# Patient Record
Sex: FEMALE | Race: WHITE | NOT HISPANIC OR LATINO | Employment: UNEMPLOYED | URBAN - NONMETROPOLITAN AREA
[De-identification: names, ages, dates, MRNs, and addresses within clinical notes are randomized per-mention and may not be internally consistent; named-entity substitution may affect disease eponyms.]

---

## 2017-01-16 RX ORDER — CITALOPRAM 40 MG/1
TABLET ORAL
Qty: 30 TABLET | Refills: 11 | Status: SHIPPED | OUTPATIENT
Start: 2017-01-16 | End: 2018-01-18 | Stop reason: SDUPTHER

## 2017-02-02 ENCOUNTER — OFFICE VISIT (OUTPATIENT)
Dept: FAMILY MEDICINE CLINIC | Facility: CLINIC | Age: 47
End: 2017-02-02

## 2017-02-02 VITALS
HEART RATE: 91 BPM | TEMPERATURE: 97.7 F | WEIGHT: 189.6 LBS | BODY MASS INDEX: 30.47 KG/M2 | DIASTOLIC BLOOD PRESSURE: 70 MMHG | SYSTOLIC BLOOD PRESSURE: 108 MMHG | HEIGHT: 66 IN | OXYGEN SATURATION: 99 %

## 2017-02-02 DIAGNOSIS — H69.82 EUSTACHIAN TUBE DYSFUNCTION, LEFT: Primary | ICD-10-CM

## 2017-02-02 PROCEDURE — 99213 OFFICE O/P EST LOW 20 MIN: CPT | Performed by: FAMILY MEDICINE

## 2017-02-02 RX ORDER — METHYLPREDNISOLONE 4 MG/1
TABLET ORAL
Qty: 21 TABLET | Refills: 0 | Status: SHIPPED | OUTPATIENT
Start: 2017-02-02 | End: 2018-08-20

## 2017-02-02 RX ORDER — FLUTICASONE PROPIONATE 50 MCG
2 SPRAY, SUSPENSION (ML) NASAL DAILY
Qty: 1 EACH | Refills: 11 | Status: SHIPPED | OUTPATIENT
Start: 2017-02-02 | End: 2017-03-04

## 2017-02-02 RX ORDER — LORATADINE 10 MG/1
10 TABLET ORAL DAILY
Qty: 30 TABLET | Refills: 11 | Status: SHIPPED | OUTPATIENT
Start: 2017-02-02 | End: 2018-02-20 | Stop reason: SDUPTHER

## 2017-02-03 NOTE — PROGRESS NOTES
Subjective   Erica Cori Muhammad is a 46 y.o. female.     History of Present Illness     Left earache 2 days ago.  No fever chills.  Has sinus symptoms.    Review of Systems   Constitutional: Negative for chills, fatigue and fever.   HENT: Positive for ear pain. Negative for congestion, ear discharge, facial swelling, hearing loss, postnasal drip, rhinorrhea, sinus pressure, sore throat, trouble swallowing and voice change.    Eyes: Negative for discharge, redness and visual disturbance.   Respiratory: Negative for cough, chest tightness, shortness of breath and wheezing.    Cardiovascular: Negative for chest pain and palpitations.   Gastrointestinal: Negative for abdominal pain, blood in stool, constipation, diarrhea, nausea and vomiting.   Endocrine: Negative for polydipsia and polyuria.   Genitourinary: Negative for dysuria, flank pain, hematuria and urgency.   Musculoskeletal: Negative for arthralgias, back pain, joint swelling and myalgias.   Skin: Negative for rash.   Neurological: Negative for dizziness, weakness, numbness and headaches.   Hematological: Negative for adenopathy.   Psychiatric/Behavioral: Negative for confusion and sleep disturbance. The patient is not nervous/anxious.        Objective   Physical Exam   Constitutional: She is oriented to person, place, and time. She appears well-developed and well-nourished.   HENT:   Head: Normocephalic and atraumatic.   Right Ear: External ear normal.   Left Ear: External ear normal.   Nose: Nose normal.   Mouth/Throat: Oropharynx is clear and moist.   Eyes: Conjunctivae and EOM are normal. Pupils are equal, round, and reactive to light.   Neck: Normal range of motion. Neck supple.   Cardiovascular: Normal rate, regular rhythm and normal heart sounds.  Exam reveals no gallop and no friction rub.    No murmur heard.  Pulmonary/Chest: Effort normal and breath sounds normal.   Abdominal: Soft. Bowel sounds are normal. She exhibits no distension. There is no  tenderness. There is no rebound and no guarding.   Musculoskeletal: Normal range of motion. She exhibits no edema or deformity.   Neurological: She is alert and oriented to person, place, and time. No cranial nerve deficit.   Skin: Skin is warm and dry. No rash noted. No erythema.   Psychiatric: She has a normal mood and affect. Her behavior is normal. Judgment and thought content normal.   Nursing note and vitals reviewed.      Assessment/Plan   Erica was seen today for earache.    Diagnoses and all orders for this visit:    Eustachian tube dysfunction, left  -     loratadine (CLARITIN) 10 MG tablet; Take 1 tablet by mouth Daily.  -     fluticasone (FLONASE) 50 MCG/ACT nasal spray; 2 sprays into each nostril Daily for 30 days. Administer 2 sprays in each nostril for each dose.  -     MethylPREDNISolone (MEDROL, AVERY,) 4 MG tablet; Take as directed on package instructions.      Went over meds.  May take 2-3 weeks for ear to clear

## 2017-03-20 RX ORDER — OSELTAMIVIR PHOSPHATE 75 MG/1
75 CAPSULE ORAL DAILY
Qty: 10 CAPSULE | Refills: 0 | Status: SHIPPED | OUTPATIENT
Start: 2017-03-20 | End: 2018-03-20 | Stop reason: SDUPTHER

## 2017-07-03 DIAGNOSIS — R51.9 HEADACHE, UNSPECIFIED HEADACHE TYPE: ICD-10-CM

## 2017-07-05 RX ORDER — VERAPAMIL HYDROCHLORIDE 240 MG/1
TABLET, FILM COATED, EXTENDED RELEASE ORAL
Qty: 30 TABLET | Refills: 5 | Status: SHIPPED | OUTPATIENT
Start: 2017-07-05 | End: 2017-12-13 | Stop reason: SDUPTHER

## 2017-07-24 RX ORDER — OMEPRAZOLE 20 MG/1
CAPSULE, DELAYED RELEASE ORAL
Qty: 30 CAPSULE | Refills: 11 | Status: SHIPPED | OUTPATIENT
Start: 2017-07-24 | End: 2018-07-31 | Stop reason: SDUPTHER

## 2017-11-20 ENCOUNTER — TELEPHONE (OUTPATIENT)
Dept: FAMILY MEDICINE CLINIC | Facility: CLINIC | Age: 47
End: 2017-11-20

## 2017-11-20 RX ORDER — ALBUTEROL SULFATE 90 UG/1
2 AEROSOL, METERED RESPIRATORY (INHALATION) 4 TIMES DAILY PRN
Qty: 1 INHALER | Refills: 11 | Status: SHIPPED | OUTPATIENT
Start: 2017-11-20

## 2017-12-13 DIAGNOSIS — R51.9 HEADACHE, UNSPECIFIED HEADACHE TYPE: ICD-10-CM

## 2017-12-13 RX ORDER — VERAPAMIL HYDROCHLORIDE 240 MG/1
TABLET, FILM COATED, EXTENDED RELEASE ORAL
Qty: 30 TABLET | Refills: 5 | Status: SHIPPED | OUTPATIENT
Start: 2017-12-13 | End: 2018-07-31 | Stop reason: SDUPTHER

## 2018-01-18 RX ORDER — CITALOPRAM 40 MG/1
TABLET ORAL
Qty: 30 TABLET | Refills: 11 | Status: SHIPPED | OUTPATIENT
Start: 2018-01-18 | End: 2018-08-20 | Stop reason: ALTCHOICE

## 2018-02-20 DIAGNOSIS — H69.82 EUSTACHIAN TUBE DYSFUNCTION, LEFT: ICD-10-CM

## 2018-02-20 RX ORDER — LORATADINE 10 MG/1
TABLET ORAL
Qty: 30 TABLET | Refills: 11 | Status: SHIPPED | OUTPATIENT
Start: 2018-02-20

## 2018-03-20 RX ORDER — OSELTAMIVIR PHOSPHATE 75 MG/1
75 CAPSULE ORAL DAILY
Qty: 10 CAPSULE | Refills: 0 | Status: SHIPPED | OUTPATIENT
Start: 2018-03-20 | End: 2018-08-20

## 2018-07-31 DIAGNOSIS — R51.9 HEADACHE, UNSPECIFIED HEADACHE TYPE: ICD-10-CM

## 2018-07-31 RX ORDER — OMEPRAZOLE 20 MG/1
CAPSULE, DELAYED RELEASE ORAL
Qty: 30 CAPSULE | Refills: 11 | Status: SHIPPED | OUTPATIENT
Start: 2018-07-31 | End: 2019-08-11 | Stop reason: SDUPTHER

## 2018-07-31 RX ORDER — VERAPAMIL HYDROCHLORIDE 240 MG/1
TABLET, FILM COATED, EXTENDED RELEASE ORAL
Qty: 30 TABLET | Refills: 5 | Status: SHIPPED | OUTPATIENT
Start: 2018-07-31 | End: 2019-01-28 | Stop reason: SDUPTHER

## 2018-08-20 ENCOUNTER — OFFICE VISIT (OUTPATIENT)
Dept: FAMILY MEDICINE CLINIC | Facility: CLINIC | Age: 48
End: 2018-08-20

## 2018-08-20 VITALS
HEART RATE: 88 BPM | SYSTOLIC BLOOD PRESSURE: 120 MMHG | DIASTOLIC BLOOD PRESSURE: 80 MMHG | OXYGEN SATURATION: 99 % | TEMPERATURE: 98.3 F | HEIGHT: 64 IN

## 2018-08-20 DIAGNOSIS — F41.9 ANXIETY: Primary | ICD-10-CM

## 2018-08-20 PROCEDURE — 99213 OFFICE O/P EST LOW 20 MIN: CPT | Performed by: FAMILY MEDICINE

## 2018-08-20 RX ORDER — BUSPIRONE HYDROCHLORIDE 5 MG/1
5 TABLET ORAL 3 TIMES DAILY
Qty: 90 TABLET | Refills: 11 | OUTPATIENT
Start: 2018-08-20 | End: 2021-01-06

## 2018-08-20 RX ORDER — ESCITALOPRAM OXALATE 10 MG/1
10 TABLET ORAL DAILY
Qty: 30 TABLET | Refills: 11 | Status: SHIPPED | OUTPATIENT
Start: 2018-08-20 | End: 2018-12-07

## 2018-08-20 RX ORDER — HYDROXYZINE PAMOATE 25 MG/1
25 CAPSULE ORAL EVERY 6 HOURS PRN
Qty: 60 CAPSULE | Refills: 1 | OUTPATIENT
Start: 2018-08-20 | End: 2021-01-06

## 2018-08-20 NOTE — PROGRESS NOTES
" Subjective   Erica Cori Muhammad is a 48 y.o. female.     History of Present Illness     Sometimes feels so anxious its like fire inside.   On celexa 40mg qd  Difficult time taking care of her dad    Review of Systems   Constitutional: Negative for chills, fatigue and fever.   HENT: Negative for congestion, ear discharge, ear pain, facial swelling, hearing loss, postnasal drip, rhinorrhea, sinus pressure, sore throat, trouble swallowing and voice change.    Eyes: Negative for discharge, redness and visual disturbance.   Respiratory: Negative for cough, chest tightness, shortness of breath and wheezing.    Cardiovascular: Negative for chest pain and palpitations.   Gastrointestinal: Negative for abdominal pain, blood in stool, constipation, diarrhea, nausea and vomiting.   Endocrine: Negative for polydipsia and polyuria.   Genitourinary: Negative for dysuria, flank pain, hematuria and urgency.   Musculoskeletal: Negative for arthralgias, back pain, joint swelling and myalgias.   Skin: Negative for rash.   Neurological: Negative for dizziness, weakness, numbness and headaches.   Hematological: Negative for adenopathy.   Psychiatric/Behavioral: Negative for confusion and sleep disturbance. The patient is nervous/anxious.            /80 (BP Location: Left arm, Patient Position: Sitting, Cuff Size: Adult)   Pulse 88   Temp 98.3 °F (36.8 °C) (Temporal Artery )   Ht 162.6 cm (64.02\")   SpO2 99%       Objective     Physical Exam   Constitutional: She is oriented to person, place, and time. She appears well-developed and well-nourished.   HENT:   Head: Normocephalic and atraumatic.   Right Ear: External ear normal.   Left Ear: External ear normal.   Nose: Nose normal.   Eyes: Pupils are equal, round, and reactive to light. Conjunctivae and EOM are normal.   Neck: Normal range of motion.   Pulmonary/Chest: Effort normal.   Musculoskeletal: Normal range of motion.   Neurological: She is alert and oriented to " person, place, and time.   Psychiatric: She has a normal mood and affect. Her behavior is normal. Judgment and thought content normal.   Nursing note and vitals reviewed.          PAST MEDICAL HISTORY     Past Medical History:   Diagnosis Date   • Acute bronchitis    • Allergic rhinitis due to pollen    • Anxiety state    • Common cold    • Cough    • Depressive disorder    • Depressive disorder    • Drug therapy     long-term   • Dysfunction of eustachian tube    • Encounter for gynecological examination    • Encounter for removal of intrauterine contraceptive device    • Essential hypertension    • Eustachian tube disorder    • Gastroesophageal reflux disease    • Headache     chronic rebound   • Health maintenance examination     individual health exam   • Hiatal hernia    • Hypertensive disorder     resolved after pregnancy   • IUD check up     in place   • Lump or mass in breast    • Plantar fascial fibromatosis    • Serous otitis media    • Upper respiratory infection       PAST SURGICAL HISTORY     Past Surgical History:   Procedure Laterality Date   • CHOLECYSTECTOMY  03/16/2004   • DILATATION AND CURETTAGE     • INJECTION OF MEDICATION  03/17/2016    Kenalog      SOCIAL HISTORY     Social History     Social History   • Marital status:      Social History Main Topics   • Smoking status: Never Smoker   • Smokeless tobacco: Never Used   • Alcohol use No   • Drug use: No   • Sexual activity: Defer     Other Topics Concern   • Not on file      ALLERGIES   Clinoril [sulindac] and Neurontin [gabapentin]   MEDICATIONS     Current Outpatient Prescriptions   Medication Sig Dispense Refill   • albuterol (PROVENTIL HFA;VENTOLIN HFA) 108 (90 Base) MCG/ACT inhaler Inhale 2 puffs 4 (Four) Times a Day As Needed for Wheezing. Any brand albuterol is fine 1 inhaler 11   • cetirizine (ZyrTEC) 10 MG tablet Take 10 mg by mouth daily.     • loratadine (CLARITIN) 10 MG tablet TAKE 1 TABLET BY MOUTH DAILY. 30 tablet 11   •  omeprazole (priLOSEC) 20 MG capsule TAKE 1 CAPSULE BY MOUTH EVERY MORNING 30 MINUTES BEFORE FIRST MEAL. 30 capsule 11   • verapamil SR (CALAN-SR) 240 MG CR tablet TAKE 1 TABLET BY MOUTH EVERY NIGHT AT BEDTIME 30 tablet 5   • benzonatate (TESSALON) 200 MG capsule Take 1 capsule by mouth 3 (Three) Times a Day As Needed for Cough. 30 capsule 0   • busPIRone (BUSPAR) 5 MG tablet Take 1 tablet by mouth 3 (Three) Times a Day. 90 tablet 11   • escitalopram (LEXAPRO) 10 MG tablet Take 1 tablet by mouth Daily. 30 tablet 11   • promethazine (PHENERGAN) 25 MG tablet Take 1 tablet by mouth Every 6 (Six) Hours As Needed for Nausea or Vomiting. 20 tablet 0   • promethazine-dextromethorphan (PROMETHAZINE-DM) 6.25-15 MG/5ML syrup Take 5 mL by mouth At Night As Needed for Cough. 120 mL 0   • pseudoephedrine (SUDAFED) 30 MG tablet Take 1 tablet by mouth Every 6 (Six) Hours As Needed for Congestion. 30 tablet 0     No current facility-administered medications for this visit.         The following portions of the patient's history were reviewed and updated as appropriate: allergies, current medications, past family history, past medical history, past social history, past surgical history and problem list.        Assessment/Plan   Erica was seen today for anxiety.    Diagnoses and all orders for this visit:    Anxiety    Other orders  -     escitalopram (LEXAPRO) 10 MG tablet; Take 1 tablet by mouth Daily.  -     busPIRone (BUSPAR) 5 MG tablet; Take 1 tablet by mouth 3 (Three) Times a Day.  -     hydrOXYzine (VISTARIL) 25 MG capsule; Take 1 capsule by mouth Every 6 (Six) Hours As Needed for Anxiety.        Change to lexapro so if I need to, I can increase the dose  Added buspar  Vistaril prn.               No Follow-up on file.                  This document has been electronically signed by Amauri Brown MD on August 20, 2018 2:33 PM

## 2019-01-07 RX ORDER — CITALOPRAM 40 MG/1
TABLET ORAL
Qty: 30 TABLET | Refills: 11 | Status: SHIPPED | OUTPATIENT
Start: 2019-01-07 | End: 2020-01-13

## 2019-01-28 DIAGNOSIS — R51.9 HEADACHE, UNSPECIFIED HEADACHE TYPE: ICD-10-CM

## 2019-01-29 RX ORDER — VERAPAMIL HYDROCHLORIDE 240 MG/1
TABLET, FILM COATED, EXTENDED RELEASE ORAL
Qty: 30 TABLET | Refills: 11 | Status: SHIPPED | OUTPATIENT
Start: 2019-01-29 | End: 2020-02-03

## 2019-08-12 RX ORDER — OMEPRAZOLE 20 MG/1
CAPSULE, DELAYED RELEASE ORAL
Qty: 30 CAPSULE | Refills: 0 | Status: SHIPPED | OUTPATIENT
Start: 2019-08-12 | End: 2019-09-10 | Stop reason: SDUPTHER

## 2019-09-10 RX ORDER — OMEPRAZOLE 20 MG/1
CAPSULE, DELAYED RELEASE ORAL
Qty: 30 CAPSULE | Refills: 11 | Status: SHIPPED | OUTPATIENT
Start: 2019-09-10 | End: 2020-09-17

## 2020-01-13 RX ORDER — CITALOPRAM 40 MG/1
TABLET ORAL
Qty: 30 TABLET | Refills: 0 | Status: SHIPPED | OUTPATIENT
Start: 2020-01-13 | End: 2020-02-07

## 2020-01-31 DIAGNOSIS — R51.9 HEADACHE, UNSPECIFIED HEADACHE TYPE: ICD-10-CM

## 2020-02-03 RX ORDER — VERAPAMIL HYDROCHLORIDE 240 MG/1
TABLET, FILM COATED, EXTENDED RELEASE ORAL
Qty: 30 TABLET | Refills: 11 | Status: SHIPPED | OUTPATIENT
Start: 2020-02-03 | End: 2021-01-29

## 2020-02-07 RX ORDER — CITALOPRAM 40 MG/1
TABLET ORAL
Qty: 90 TABLET | Refills: 3 | Status: SHIPPED | OUTPATIENT
Start: 2020-02-07 | End: 2021-01-29

## 2020-09-01 RX ORDER — OMEPRAZOLE 20 MG/1
CAPSULE, DELAYED RELEASE ORAL
Qty: 12 CAPSULE | OUTPATIENT
Start: 2020-09-01

## 2020-09-17 RX ORDER — OMEPRAZOLE 20 MG/1
CAPSULE, DELAYED RELEASE ORAL
Qty: 30 CAPSULE | Refills: 11 | Status: SHIPPED | OUTPATIENT
Start: 2020-09-17

## 2021-01-06 PROBLEM — K44.9 HIATAL HERNIA: Status: ACTIVE | Noted: 2019-04-23

## 2021-01-06 PROBLEM — J30.2 SEASONAL ALLERGIES: Status: ACTIVE | Noted: 2019-04-23

## 2021-01-06 PROBLEM — G89.29 CHRONIC LEFT SHOULDER PAIN: Status: ACTIVE | Noted: 2019-04-23

## 2021-01-06 PROBLEM — M25.512 CHRONIC LEFT SHOULDER PAIN: Status: ACTIVE | Noted: 2019-04-23

## 2021-01-06 PROBLEM — K21.9 GASTROESOPHAGEAL REFLUX DISEASE: Status: ACTIVE | Noted: 2019-04-23

## 2021-01-29 DIAGNOSIS — R51.9 HEADACHE, UNSPECIFIED HEADACHE TYPE: ICD-10-CM

## 2021-01-29 RX ORDER — VERAPAMIL HYDROCHLORIDE 240 MG/1
TABLET, FILM COATED, EXTENDED RELEASE ORAL
Qty: 30 TABLET | Refills: 0 | Status: SHIPPED | OUTPATIENT
Start: 2021-01-29 | End: 2021-03-01

## 2021-01-29 RX ORDER — CITALOPRAM 40 MG/1
TABLET ORAL
Qty: 30 TABLET | Refills: 0 | Status: SHIPPED | OUTPATIENT
Start: 2021-01-29 | End: 2021-03-01

## 2021-02-04 ENCOUNTER — TRANSCRIBE ORDERS (OUTPATIENT)
Dept: PHYSICAL THERAPY | Facility: HOSPITAL | Age: 51
End: 2021-02-04

## 2021-02-04 DIAGNOSIS — Z98.890 STATUS POST LEFT KNEE SURGERY: ICD-10-CM

## 2021-02-04 DIAGNOSIS — M25.562 LEFT KNEE PAIN, UNSPECIFIED CHRONICITY: Primary | ICD-10-CM

## 2021-02-16 ENCOUNTER — APPOINTMENT (OUTPATIENT)
Dept: PHYSICAL THERAPY | Facility: HOSPITAL | Age: 51
End: 2021-02-16

## 2021-02-23 ENCOUNTER — APPOINTMENT (OUTPATIENT)
Dept: PHYSICAL THERAPY | Facility: HOSPITAL | Age: 51
End: 2021-02-23

## 2021-02-28 DIAGNOSIS — R51.9 HEADACHE, UNSPECIFIED HEADACHE TYPE: ICD-10-CM

## 2021-03-01 RX ORDER — CITALOPRAM 40 MG/1
TABLET ORAL
Qty: 30 TABLET | Refills: 0 | Status: SHIPPED | OUTPATIENT
Start: 2021-03-01

## 2021-03-01 RX ORDER — VERAPAMIL HYDROCHLORIDE 240 MG/1
TABLET, FILM COATED, EXTENDED RELEASE ORAL
Qty: 30 TABLET | Refills: 0 | Status: SHIPPED | OUTPATIENT
Start: 2021-03-01

## 2021-03-02 ENCOUNTER — HOSPITAL ENCOUNTER (OUTPATIENT)
Dept: PHYSICAL THERAPY | Facility: HOSPITAL | Age: 51
Setting detail: THERAPIES SERIES
Discharge: HOME | End: 2021-03-02

## 2021-03-02 DIAGNOSIS — M17.12 PRIMARY OSTEOARTHRITIS OF LEFT KNEE: Primary | ICD-10-CM

## 2021-03-02 PROCEDURE — 97162 PT EVAL MOD COMPLEX 30 MIN: CPT

## 2021-03-02 NOTE — THERAPY EVALUATION
Outpatient Physical Therapy Ortho Initial Evaluation  [facility]     Patient Name: Erica Muhammad  : 1970  MRN: [id number]  Today's Date: 3/2/2021      Visit Date: 2021   Attendance:  (TBD approved)  Subjective Improvement: n/a  Next MD Visit: TBD  Recert Date: 3/23/2021    Therapy Diagnosis: S/P left TKA      Patient Active Problem List   Diagnosis   • Seasonal allergies   • Hiatal hernia   • Gastroesophageal reflux disease   • Chronic migraine without aura   • Chronic left shoulder pain        Past Medical History:   Diagnosis Date   • Acute bronchitis    • Allergic rhinitis due to pollen    • Anxiety state    • Common cold    • Cough    • Depressive disorder    • Depressive disorder    • Drug therapy     long-term   • Dysfunction of eustachian tube    • Encounter for gynecological examination    • Encounter for removal of intrauterine contraceptive device    • Essential hypertension    • Eustachian tube disorder    • Gastroesophageal reflux disease    • Headache     chronic rebound   • Health maintenance examination     individual health exam   • Hiatal hernia    • Hypertensive disorder     resolved after pregnancy   • IUD check up     in place   • Lump or mass in breast    • Plantar fascial fibromatosis    • Serous otitis media    • Upper respiratory infection         Past Surgical History:   Procedure Laterality Date   • CHOLECYSTECTOMY  2004   • DILATATION AND CURETTAGE     • INJECTION OF MEDICATION  2016    Kenalog       Current Outpatient Medications:   •  albuterol (PROVENTIL HFA;VENTOLIN HFA) 108 (90 Base) MCG/ACT inhaler, Inhale 2 puffs 4 (Four) Times a Day As Needed for Wheezing. Any brand albuterol is fine, Disp: 1 inhaler, Rfl: 11  •  citalopram (CeleXA) 40 MG tablet, TAKE 1/2 TO 1 TABLET BY MOUTH EVERY DAY, Disp: 30 tablet, Rfl: 0  •  Elastic Bandages & Supports (Knee Brace Adjustable Hinged) misc, 1 Device Daily., Disp: 1 each, Rfl: 0  •  loratadine  "(CLARITIN) 10 MG tablet, TAKE 1 TABLET BY MOUTH DAILY., Disp: 30 tablet, Rfl: 11  •  meloxicam (MOBIC) 7.5 MG tablet, Take 1 tablet by mouth Daily., Disp: 30 tablet, Rfl: 0  •  omeprazole (priLOSEC) 20 MG capsule, TAKE 1 CAPSULE BY MOUTH EVERY MORNING 30 MINUTES BEFORE A MEAL, Disp: 30 capsule, Rfl: 11  •  verapamil SR (CALAN-SR) 240 MG CR tablet, TAKE 1 TABLET BY MOUTH EVERY NIGHT AT BEDTIME, Disp: 30 tablet, Rfl: 0    Allergies   Allergen Reactions   • Clinoril [Sulindac] Dizziness   • Neurontin [Gabapentin] Dizziness         Visit Dx:     ICD-10-CM ICD-9-CM   1. Primary osteoarthritis of left knee  M17.12 715.16         Patient History     Row Name 03/02/21 0800             History    Chief Complaint  Joint stiffness \"Soreness\"  -      Date Current Problem(s) Began  02/03/21  -      Brief Description of Current Complaint  Pt stated that she was having knee pain for at least 6 months before undergoing a TKA on 2/3/2021. She stated that she now has soreness and stiffness. She stated that she stayed overnight at the hospital and had some physical therapy while at the hospital. She reports no complications with surgery and has not had any physical therapy since leaving the hospital.  female living in a two story home (no need to go upstairs) with her son and fiancé. 2 steps to enter without a hand rail.  -      Previous treatment for THIS PROBLEM  Rehabilitation;Surgery  -      Surgery Date:  02/03/21  -      Patient/Caregiver Goals  Improve mobility;Improve strength  -      Current Tobacco Use  No  -      Smoking Status  No  -      Patient's Rating of General Health  Good  -      Hand Dominance  right-handed  -      Occupation/sports/leisure activities  Out of work for the time being. Hobbies: TV, playing with my dog  -         Pain     Pain Location  Knee Left  -      Pain at Present  0  -      Pain at Best  0  -      Pain at Worst  8  -      Pain Frequency  Intermittent  -   " "   Pain Description  Aching  -      What Performance Factors Make the Current Problem(s) WORSE?  Standing, walking, bending knee, squatting  -      What Performance Factors Make the Current Problem(s) BETTER?  Ice, exercises from hospital  -      Tolerance Time- Standing  ~15-20 with cane  -      Tolerance Time- Walking  ~30 min with cane  -      Is your sleep disturbed?  Yes  -      Is medication used to assist with sleep?  No  -      Difficulties at work?  Not working  -      Difficulties with ADL's?  \"A little bit with dressing and bathing\". \"I have to move slow and take lots of rest breaks for cooking and cleaning.\"  -      Difficulties with recreational activities?  No issues  -         Fall Risk Assessment    Any falls in the past year:  No  -      Does patient have a fear of falling  No  -         Daily Activities    Primary Language  English  -         Safety    Are you being hurt, hit, or frightened by anyone at home or in your life?  No  -MH      Are you being neglected by a caregiver  No  -        User Key  (r) = Recorded By, (t) = Taken By, (c) = Cosigned By    Initials Name Provider Type     Beatriz Hurst, ALYSHA Physical Therapist          PT Ortho     Row Name 03/02/21 0800       Subjective Comments    Subjective Comments  See therapy patient history  -       Precautions and Contraindications    Contraindications  DOS: 2/3/2021  -       Subjective Pain    Able to rate subjective pain?  yes  -    Pre-Treatment Pain Level  0  -    Post-Treatment Pain Level  1  -       Posture/Observations    Posture/Observations Comments  Sitting posture: forward flexed trunk, rounded shoulder, forward head. Equal weight distribution. TTP left: mid-distal shin, around patella and near incision. Mod edema in knee joint grossly. Incision is clean and healing well. Staples have been removed, steri strips present.   -       General ROM    RT Lower Ext  Comment  -    LT Lower Ext  " Comment  -MH       Right Lower Ext    RT Lower Extremity Comments  AROM WFL  -MH       Left Lower Ext    Lt Knee Extension/Flexion AROM  10-95 deg; no pain  -    LT Lower Extremity Comments  Hip and ankle AROM WFL  -       MMT (Manual Muscle Testing)    Rt Lower Ext  Comments  -    Lt Lower Ext  Comments;Lt Hip Flexion;Lt Hip Extension;Lt Hip ADduction;Lt Hip ABduction;Lt Hip Internal (Medial) Rotation;Lt Hip External (Lateral) Rotation;Lt Knee Extension;Lt Knee Flexion;Lt Ankle Dorsiflexion  -       MMT Right Lower Ext    Rt Lower Extremity Comments   5/5 grossly  -MH       MMT Left Lower Ext    Lt Hip Flexion MMT, Gross Movement  (5/5) normal  -MH    Lt Hip Extension MMT, Gross Movement  (5/5) normal  -    Lt Hip ABduction MMT, Gross Movement  (4/5) good  -MH    Lt Hip ADduction MMT, Gross Movement  (4+/5) good plus  -MH    Lt Hip Internal (Medial) Rotation MMT, Gross Movement  (4+/5) good plus  -MH    Lt Hip External (Lateral) Rotation MMT, Gross Movement  (4+/5) good plus  -    Lt Knee Extension MMT, Gross Movement  (4/5) good  -MH    Lt Knee Flexion MMT, Gross Movement  (4/5) good  -MH    Lt Ankle Dorsiflexion MMT, Gross Movement  (5/5) normal  -       Sensation    Sensation WNL?  WNL  -    Light Touch  No apparent deficits  -       Lower Extremity Flexibility    Hamstrings  Left:;Moderately limited  -    Quadriceps  Left:;Moderately limited;Severely limited  -    Gastrocnemius  Left:;Moderately limited  -       Balance Skills Training    Balance Comments  Not assessed this visit  -       Gait/Stairs (Locomotion)    [place] Level (Gait)  modified independence  -    Assistive Device (Gait)  cane, straight  -    Comment (Gait/Stairs)  Antalgic gait, decreased TKE, decreased stance time on left, pronated ankle/foot, decreased heel strike for    -      User Key  (r) = Recorded By, (t) = Taken By, (c) = Cosigned By    Initials Name Provider Type    Beatriz Harper, PT  Physical Therapist                      Therapy Education  Education Details: Findings of evaluation and plan for physical therapy. Ice with elevation, ankle pumps, sit with knee floating and toes towards ceiling.  Given: HEP, Symptoms/condition management, Edema management  How Provided: Verbal  Provided to: Patient  Level of Understanding: Verbalized     PT OP Goals     Row Name 03/02/21 0800          PT Short Term Goals    STG Date to Achieve  03/23/21  -     STG 1  Pt will be independent with HEP for self-management of symptoms.  -     STG 1 Progress  New  [facility]     STG 2  Pt's knee flex AROM will improve to 120 deg.  -     STG 2 Progress  New  -     STG 3  Pt's knee ext AROM will improve to 0 deg.  -     STG 3 Progress  New  [facility]        Long Term Goals    LTG Date to Achieve  04/13/21  -     LTG 1  Pt will report a subjective improvement of at least 90% in symptoms as a measure to return to PLOF.  -     LTG 1 Progress  New  [facility]     LTG 2  Pt' LEFS score will improve by at least 18 points.  -     LTG 2 Progress  New  [facility]     LTG 3  Pt's right knee flex/ext MMT will improve to 5/5 as a measure of improved strength.  -     LTG 3 Progress  New  [facility]     LTG 4  Pt will be able to ambulate at least 600 feet without an assistive device and without gait deviations for safe community ambulation.   -     LTG 4 Progress  New  [facility]     LTG 5  Pt will be able to negotiate one flight of stairs without gait deviations for safe community ambulation.  -     LTG 5 Progress  New  [facility]     LTG 6  Pt will be able to balance on left LE in SLS for at least 15 sec.  -[facility] 6 Progress  New  [facility]        Time Calculation    PT Goal Re-Cert Due Date  03/23/21  -       User Key  (r) = Recorded By, (t) = Taken By, (c) = Cosigned By    Initials Name Provider Type    Beatriz Harper, PT Physical Therapist          PT Assessment/Plan     Row Name 03/02/21 0800          PT Assessment    Functional Limitations  Decreased  safety during functional activities;Impaired gait;Impaired locomotion;Limitation in home management;Limitations in community activities;Limitations in functional capacity and performance;Performance in leisure activities;Performance in self-care ADL  -     Impairments  Balance;Edema;Gait;Endurance;Impaired flexibility;Impaired muscle length;Impaired muscle power;Joint mobility;Muscle strength;Pain;Range of motion;Posture  -     Assessment Comments  Ms. Muhammad presents to physical therapy s/p left TKA that occurred on 2/3/2021. She is currently ambulating with a SPC (previously independent) and needs assistance with dressing and bathing. She is able to complete IADLs such cooking and cleaning but needs some help, increased time, and added rest breaks. She demonstrated decreased ROM, decreased strength, edema, TTP, decreased balance, gait deviations, pain, and decreased flexibility. She would benefit from skilled physical therapy to address these deficits in order for her to return to her prior level of function.  -     Please refer to paper survey for additional self-reported information  Yes  -MH     Rehab Potential  Good  -     Patient/caregiver participated in establishment of treatment plan and goals  Yes  -     Patient would benefit from skilled therapy intervention  Yes  -MH        PT Plan    PT Frequency  2x/week  -     Predicted Duration of Therapy Intervention (PT)  5-6 weeks  -     Planned CPT's?  PT EVAL MOD COMPLELITY: 61160;PT RE-EVAL: 30537;PT THER ACT EA 15 MIN: 24978;PT THER PROC EA 15 MIN: 23056;PT MANUAL THERAPY EA 15 MIN: 95936;PT GAIT TRAINING EA 15 MIN: 37183;PT NEUROMUSC RE-EDUCATION EA 15 MIN: 91197;PT SELF CARE/HOME MGMT/TRAIN EA 15: 01816;PT ELECTRICAL STIM UNATTEND: ;PT ELECTRICAL STIM ATTD EA 15 MIN: 36552;PT ULTRASOUND EA 15 MIN: 87620;PT HOT/COLD PACK WC NONMCARE: 43307;PT THER SUPP EA 15 MIN  -     PT Plan Comments  ROM, strength, gait, balance, stretching, modalities  and manual as needed.  -       User Key  (r) = Recorded By, (t) = Taken By, (c) = Cosigned By    Initials Name Provider Type    Beatriz Harper PT Physical Therapist            OP Exercises     Row Name 03/02/21 0800             Subjective Comments    Subjective Comments  See therapy patient history  -         Subjective Pain    Able to rate subjective pain?  yes  -      Pre-Treatment Pain Level  0  -      Post-Treatment Pain Level  1  -        User Key  (r) = Recorded By, (t) = Taken By, (c) = Cosigned By    Initials Name Provider Type    Beatriz Harper PT Physical Therapist                        Outcome Measure Options: Lower Extremity Functional Scale (LEFS)  Lower Extremity Functional Index  Any of your usual work, housework or school activities: Moderate difficulty  Your usual hobbies, recreational or sporting activities: Moderate difficulty  Getting into or out of the bath: Moderate difficulty  Walking between rooms: A little bit of difficulty  Putting on your shoes or socks: A little bit of difficulty  Squatting: Quite a bit of difficulty  Lifting an object, like a bag of groceries from the floor: Moderate difficulty  Performing light activities around your home: Moderate difficulty  Performing heavy activities around your home: Moderate difficulty  Getting into or out of a car: Quite a bit of difficulty  Walking 2 blocks: Quite a bit of difficulty  Walking a mile: Extreme difficulty or unable to perform activity  Going up or down 10 stairs (about 1 flight of stairs): Quite a bit of difficulty  Standing for 1 hour: Moderate difficulty  Sitting for 1 hour: A little bit of difficulty  Running on even ground: Quite a bit of difficulty  Running on uneven ground: Quite a bit of difficulty  Making sharp turns while running fast: Extreme difficulty or unable to perform activity  Hopping: Extreme difficulty or unable to perform activity  Rolling over in bed: A little bit of difficulty  Total:  32      Time Calculation:     Start Time: 0847  Stop Time: 0925  Time Calculation (min): 38 min     Therapy Charges for Today     Code Description Service Date Service Provider Modifiers Qty    [id number] HC PT EVAL MOD COMPLEXITY 3 3/2/2021 Beatriz uHrst, PT GP 1          PT G-Codes  Outcome Measure Options: Lower Extremity Functional Scale (LEFS)  Total: 32         Beatriz Hurst, PT  3/2/2021

## 2021-03-09 ENCOUNTER — HOSPITAL ENCOUNTER (OUTPATIENT)
Dept: PHYSICAL THERAPY | Facility: HOSPITAL | Age: 51
Setting detail: THERAPIES SERIES
Discharge: HOME | End: 2021-03-09

## 2021-03-09 DIAGNOSIS — M17.12 PRIMARY OSTEOARTHRITIS OF LEFT KNEE: Primary | ICD-10-CM

## 2021-03-09 PROCEDURE — 97110 THERAPEUTIC EXERCISES: CPT

## 2021-03-09 NOTE — THERAPY TREATMENT NOTE
Outpatient Physical Therapy Ortho Treatment Note  [facility]     Patient Name: Erica Muhammad  : 1970  MRN: [id number]  Today's Date: 3/9/2021      Visit Date: 2021     Attendance: 2/2  (Eval + 2 until 2021)  Subjective Improvement: 0%  Next MD Visit: 03/15/2021  Recert Date: 3/23/2021     Therapy Diagnosis: S/P left TKA    Visit Dx:    ICD-10-CM ICD-9-CM   1. Primary osteoarthritis of left knee  M17.12 715.16       Patient Active Problem List   Diagnosis   • Seasonal allergies   • Hiatal hernia   • Gastroesophageal reflux disease   • Chronic migraine without aura   • Chronic left shoulder pain        Past Medical History:   Diagnosis Date   • Acute bronchitis    • Allergic rhinitis due to pollen    • Anxiety state    • Common cold    • Cough    • Depressive disorder    • Depressive disorder    • Drug therapy     long-term   • Dysfunction of eustachian tube    • Encounter for gynecological examination    • Encounter for removal of intrauterine contraceptive device    • Essential hypertension    • Eustachian tube disorder    • Gastroesophageal reflux disease    • Headache     chronic rebound   • Health maintenance examination     individual health exam   • Hiatal hernia    • Hypertensive disorder     resolved after pregnancy   • IUD check up     in place   • Lump or mass in breast    • Plantar fascial fibromatosis    • Serous otitis media    • Upper respiratory infection         Past Surgical History:   Procedure Laterality Date   • CHOLECYSTECTOMY  2004   • DILATATION AND CURETTAGE     • INJECTION OF MEDICATION  2016    Kenalog       PT Ortho     Row Name 21 1300       Precautions and Contraindications    Contraindications  DOS: 2/3/2021  -KH       Posture/Observations    Posture/Observations Comments  antalgic gait; with decreased knee flexion  -KH       Left Lower Ext    Lt Knee Extension/Flexion AROM  0-4-103°; 108° after stretching and PROM 0°  -KH      User  "Key  (r) = Recorded By, (t) = Taken By, (c) = Cosigned By    Initials Name Provider Type    Divya Woodward PTA Physical Therapy Assistant                      PT Assessment/Plan     Row Name 03/09/21 1300          PT Assessment    Assessment Comments  Insurance only approved the eval + 2 visits;Much improved ROM this date in both flexion and extension; Patient was given written illustration for new HEP this date; Verbalized and demonstrated understanding of all; Great effort given with all exercies; Education given to work on correct gait sequence and to  work on that at home as well;   -        PT Plan    PT Frequency  2x/week  -     Predicted Duration of Therapy Intervention (PT)  5-6 weeks  -     PT Plan Comments  One more visit approved then more will need to be requested; Continue to progress strength and ROM in the knee as able.   -       User Key  (r) = Recorded By, (t) = Taken By, (c) = Cosigned By    Initials Name Provider Type    Divya Woodward PTA Physical Therapy Assistant          Modalities     Row Name 03/09/21 1300             Subjective Pain    Post-Treatment Pain Level  0  -KH        User Key  (r) = Recorded By, (t) = Taken By, (c) = Cosigned By    Initials Name Provider Type    Divya Woodward PTA Physical Therapy Assistant        OP Exercises     Row Name 03/09/21 1300             Subjective Comments    Subjective Comments  Patient reports that she is doing well; No new issues to report  -         Subjective Pain    Able to rate subjective pain?  yes  -KH      Pre-Treatment Pain Level  0  -KH      Post-Treatment Pain Level  0  -KH         Exercise 1    Exercise Name 1  pro ll LE ROM  -KH      Time 1  10 mins F/B  -KH      Additional Comments  level 2; seat 11  -KH         Exercise 2    Exercise Name 2  HC/Hamstring stretch with strap  -KH      Sets 2  5  -KH      Time 2  30\" holds  -KH         Exercise 3    Exercise Name 3  quad sets  -KH      Sets 3  3  -KH      Reps 3  10  -KH      " "Time 3  5\" holds  -KH         Exercise 4    Exercise Name 4  SLR  -      Cueing 4  Verbal  -      Sets 4  2  -KH      Reps 4  10  -KH         Exercise 5    Exercise Name 5  SAQ over large bolster  -      Cueing 5  Verbal  -KH      Sets 5  2  -KH      Reps 5  10  -KH      Time 5  5\" holds  -KH         Exercise 6    Exercise Name 6  prone TKE's  -      Sets 6  2  -KH      Reps 6  10  -KH      Time 6  5\" holds  -KH         Exercise 7    Exercise Name 7  Heel Slides  -      Cueing 7  Verbal  -KH      Sets 7  2  -KH      Reps 7  10  -KH         Exercise 8    Exercise Name 8  gait mechanics around clinic  -KH      Time 8  2 laps  -KH        User Key  (r) = Recorded By, (t) = Taken By, (c) = Cosigned By    Initials Name Provider Type    Divya Woodward PTA Physical Therapy Assistant                       PT OP Goals     Row Name 03/09/21 1300          PT Short Term Goals    STG Date to Achieve  03/23/21  -     STG 1  Pt will be independent with HEP for self-management of symptoms.  -     STG 1 Progress  Ongoing  -     STG 2  Pt's knee flex AROm will improve to 120 deg.  -     STG 2 Progress  Progressing  -     STG 3  Pt's knee ext AROM will improve to 0 deg.  -     STG 3 Progress  Progressing  -        Long Term Goals    LTG Date to Achieve  04/13/21  -     LTG 1  Pt will report a subjective improvement of at least 90% in symptoms as a measure to return to PLOF.  -     LTG 1 Progress  Progressing  -     LTG 2  Pt' LEFS score will improve by at least 18 points.  -     LTG 2 Progress  Progressing  -     LTG 3  Pt's right knee flex/ext MMT will improve to 5/5 as a measure of improved strength.  -     LTG 3 Progress  Progressing  -     LTG 4  Pt will be able to ambulate at least 600 feet without an assistive device and without gait deviations for safe community ambulation.   -     LTG 4 Progress  Progressing  -     LTG 5  Pt will be able to negotiate one flight of stairs without gait " deviations for safe community ambulation.  -NATALYA     LTG 5 Progress  Progressing  -NATALYA     LTG 6  Pt will be able to balance on left LE in SLS for at least 15 sec.  -NATALYA     LTG 6 Progress  Progressing  -NATALYA        Time Calculation    PT Goal Re-Cert Due Date  03/23/21  -       User Key  (r) = Recorded By, (t) = Taken By, (c) = Cosigned By    Initials Name Provider Type    Divya Woodward PTA Physical Therapy Assistant          Therapy Education  Education Details: (S) Heel Slides; quad sets, extension prop. SAQ, SLR, prone TKEs, HC stretch with towel   Given: HEP  Program: New  How Provided: Verbal, Demonstration, Written  Provided to: Patient  Level of Understanding: Teach back education performed, Verbalized, Demonstrated              Time Calculation:   Start Time: 1258  Stop Time: 1343  Time Calculation (min): 45 min  Therapy Charges for Today     Code Description Service Date Service Provider Modifiers Qty    [id number] HC PT THER PROC EA 15 MIN 3/9/2021 Divya Noonan PTA GP 3                    Divya Noonan PTA  3/9/2021

## 2021-03-16 ENCOUNTER — APPOINTMENT (OUTPATIENT)
Dept: PHYSICAL THERAPY | Facility: HOSPITAL | Age: 51
End: 2021-03-16

## 2021-03-29 DIAGNOSIS — R51.9 HEADACHE, UNSPECIFIED HEADACHE TYPE: ICD-10-CM

## 2021-03-29 RX ORDER — VERAPAMIL HYDROCHLORIDE 240 MG/1
TABLET, FILM COATED, EXTENDED RELEASE ORAL
Qty: 30 TABLET | Refills: 0 | OUTPATIENT
Start: 2021-03-29

## 2021-03-31 RX ORDER — CITALOPRAM 40 MG/1
TABLET ORAL
Qty: 30 TABLET | Refills: 0 | OUTPATIENT
Start: 2021-03-31

## 2021-07-22 DIAGNOSIS — R51.9 HEADACHE, UNSPECIFIED HEADACHE TYPE: ICD-10-CM

## 2021-07-22 RX ORDER — VERAPAMIL HYDROCHLORIDE 240 MG/1
TABLET, FILM COATED, EXTENDED RELEASE ORAL
Qty: 30 TABLET | Refills: 0 | OUTPATIENT
Start: 2021-07-22

## 2021-07-22 RX ORDER — CITALOPRAM 40 MG/1
TABLET ORAL
Qty: 30 TABLET | Refills: 0 | OUTPATIENT
Start: 2021-07-22

## 2021-08-24 RX ORDER — OMEPRAZOLE 20 MG/1
CAPSULE, DELAYED RELEASE ORAL
Qty: 30 CAPSULE | Refills: 6 | OUTPATIENT
Start: 2021-08-24

## 2022-11-28 ENCOUNTER — HOSPITAL ENCOUNTER (EMERGENCY)
Facility: HOSPITAL | Age: 52
Discharge: HOME | End: 2022-11-28
Attending: FAMILY MEDICINE | Admitting: FAMILY MEDICINE

## 2022-11-28 ENCOUNTER — APPOINTMENT (OUTPATIENT)
Dept: ULTRASOUND IMAGING | Facility: HOSPITAL | Age: 52
End: 2022-11-28

## 2022-11-28 VITALS
WEIGHT: 219 LBS | OXYGEN SATURATION: 96 % | RESPIRATION RATE: 20 BRPM | DIASTOLIC BLOOD PRESSURE: 84 MMHG | BODY MASS INDEX: 37.39 KG/M2 | SYSTOLIC BLOOD PRESSURE: 147 MMHG | HEART RATE: 80 BPM | TEMPERATURE: 98.7 F | HEIGHT: 64 IN

## 2022-11-28 DIAGNOSIS — M79.604 LEG PAIN, POSTERIOR, RIGHT: Primary | ICD-10-CM

## 2022-11-28 PROCEDURE — 93971 EXTREMITY STUDY: CPT

## 2022-11-28 PROCEDURE — 99282 EMERGENCY DEPT VISIT SF MDM: CPT

## 2022-11-28 RX ORDER — VENLAFAXINE 37.5 MG/1
37.5 TABLET ORAL 2 TIMES DAILY
COMMUNITY

## 2022-11-28 RX ORDER — CYCLOBENZAPRINE HCL 10 MG
10 TABLET ORAL 3 TIMES DAILY PRN
Qty: 21 TABLET | Refills: 0 | Status: SHIPPED | OUTPATIENT
Start: 2022-11-28

## 2022-11-28 RX ORDER — ROPINIROLE 0.5 MG/1
0.5 TABLET, FILM COATED ORAL NIGHTLY
COMMUNITY

## 2022-11-28 RX ORDER — LANOLIN ALCOHOL/MO/W.PET/CERES
1000 CREAM (GRAM) TOPICAL DAILY
COMMUNITY

## 2022-11-28 RX ORDER — ROSUVASTATIN CALCIUM 10 MG/1
10 TABLET, COATED ORAL DAILY
COMMUNITY

## 2022-11-28 RX ORDER — FERROUS SULFATE 325(65) MG
325 TABLET ORAL
COMMUNITY

## 2022-11-28 RX ORDER — ERGOCALCIFEROL 1.25 MG/1
50000 CAPSULE ORAL WEEKLY
COMMUNITY